# Patient Record
Sex: FEMALE | Race: WHITE
[De-identification: names, ages, dates, MRNs, and addresses within clinical notes are randomized per-mention and may not be internally consistent; named-entity substitution may affect disease eponyms.]

---

## 2017-07-14 ENCOUNTER — HOSPITAL ENCOUNTER (OUTPATIENT)
Dept: HOSPITAL 75 - RAD | Age: 47
End: 2017-07-14
Attending: FAMILY MEDICINE
Payer: COMMERCIAL

## 2017-07-14 DIAGNOSIS — Z12.31: Primary | ICD-10-CM

## 2017-07-14 PROCEDURE — 77067 SCR MAMMO BI INCL CAD: CPT

## 2017-07-14 NOTE — DIAGNOSTIC IMAGING REPORT
Bilateral screening mammogram 2D views with tomosynthesis



The current study was also evaluated with a Computer Aided

Detection (CAD) system.



Indication: Screening. No current complaints stated on the

questionnaire.



COMPARISON: 6/10/15



FINDINGS:

The breasts are composed of heterogeneously dense parenchyma

which would decrease mammographic sensitivity. There are

benign-appearing calcifications seen. Allowing for technique and

positional differences, no suspicious change is seen.



IMPRESSION: Dense breasts with no definite change. 



ACR BI-RADS Category 2: Benign findings.

Result letter will be mailed to the patient.

Note: At least 10% of breast cancer is not imaged by mammography.



 



Dictated by: 



  Dictated on workstation # [id number]

## 2018-03-28 NOTE — HISTORY AND PHYSICAL
DATE OF SERVICE:  



COLONOSCOPY HISTORY AND PHYSICAL



DATE OF ADMISSION:

2018.



HISTORY OF PRESENT ILLNESS:

The patient is a 48-year-old white female referred by Dr. Rivers for her first

screening colonoscopy.  She was seen in the office on 2018.  She is deemed

to be of average risk as she is not aware of any family history for colon cancer

or colon polyps.  She has noted about a 5-pound weight loss and is thin framed

to begin with.  Over the past several months that she has attributed to

situational anxiety.  She denies any associated fatigue, chills, fever or

malaise.  She also denies insomnia or any depressive symptoms.  She has had no

abdominal pain and has noted no blood in her stool.



MEDICATIONS ON ADMISSION:

Include Synthroid 100 mcg, but half a tablet on Saturday and  and full

tablet other days of the week, vitamin D 3000 units daily, and a multiple

vitamin with calcium daily.



PAST SURGICAL HISTORY:

She did have a tonsillectomy and adenoidectomy as a child.



PAST MEDICAL HISTORY:

Significant for Hashimoto's thyroiditis, and anxiety, reporting no other

problems.



FAMILY HISTORY:

Father  of alcoholism-related medical issues and also had prostate cancer at

the age of 53.  Mother is obese still living at the age of 72 with hypertension,

hyperlipidemia and history of hyperparathyroidism.



SOCIAL HISTORY:

She is a  and employed by [employer] with rare alcohol intake and no

past smoking history.



PHYSICAL EXAMINATION:

GENERAL:  Reveals an articulate, well-kempt white female appears to be in no

acute distress.

VITAL SIGNS:  She is roughly a 5 feet 2 inches tall and weighing 129.6 pounds,

blood pressure 120/80 with a heart rate of 72 and regular.

CHEST:  Clear to auscultation.

CARDIOVASCULAR:  Reveals a regular rate and rhythm without murmur, S3 or S4.

NECK:  Revealed no JVD, adenopathy or bruits.

ABDOMEN:  Soft, supple without mass, organomegaly or tenderness.

EXTREMITIES:  Reveal no cyanosis, clubbing or edema.



Medical record was reviewed.  She did have an echocardiogram done in  for

reported heart murmur.  Her systolic function was normal with ejection fraction

60%. Dr. Royal did report moderate tricuspid insufficiency and elevated pulmonic

pressure estimated at 50 mmHg.  She does not exercise on a regular basis, but

did not have a murmur today nor did she have any findings to suggest pulmonary

hypertension. She denies shortness of breath, chest pain or any history of

thromboembolic disease with no evidence for cyanosis, clubbing or edema on

physical examination.



ASSESSMENT:

The patient was set up for screening colonoscopy on 2017.  In her

evaluation and review of electronic medical record, 45 minutes care time spent

today.  Prep instructions with Suprep kit were given and questions were

answered.



I thank you for the referral of this pleasant lady.





Job ID: [id number]

DocumentID: [id number]

Dictated Date:  2018 11:35:26

Transcription Date: 2018 12:08:39

Dictated By: MAY FOWLER MD

MTDD

## 2018-04-09 ENCOUNTER — HOSPITAL ENCOUNTER (OUTPATIENT)
Dept: HOSPITAL 75 - PREOP | Age: 48
End: 2018-04-09
Attending: INTERNAL MEDICINE
Payer: COMMERCIAL

## 2018-04-09 VITALS — BODY MASS INDEX: 20.49 KG/M2 | WEIGHT: 120 LBS | HEIGHT: 64 IN

## 2018-04-09 DIAGNOSIS — Z12.11: ICD-10-CM

## 2018-04-09 DIAGNOSIS — Z01.818: Primary | ICD-10-CM

## 2018-04-13 ENCOUNTER — HOSPITAL ENCOUNTER (OUTPATIENT)
Dept: HOSPITAL 75 - ENDO | Age: 48
Discharge: HOME | End: 2018-04-13
Attending: INTERNAL MEDICINE
Payer: COMMERCIAL

## 2018-04-13 VITALS — DIASTOLIC BLOOD PRESSURE: 70 MMHG | SYSTOLIC BLOOD PRESSURE: 108 MMHG

## 2018-04-13 VITALS — SYSTOLIC BLOOD PRESSURE: 108 MMHG | DIASTOLIC BLOOD PRESSURE: 68 MMHG

## 2018-04-13 VITALS — DIASTOLIC BLOOD PRESSURE: 48 MMHG | SYSTOLIC BLOOD PRESSURE: 93 MMHG

## 2018-04-13 VITALS — WEIGHT: 120 LBS | BODY MASS INDEX: 20.49 KG/M2 | HEIGHT: 64 IN

## 2018-04-13 DIAGNOSIS — Z79.899: ICD-10-CM

## 2018-04-13 DIAGNOSIS — R14.0: ICD-10-CM

## 2018-04-13 DIAGNOSIS — E06.3: ICD-10-CM

## 2018-04-13 DIAGNOSIS — F41.9: ICD-10-CM

## 2018-04-13 DIAGNOSIS — R14.3: ICD-10-CM

## 2018-04-13 DIAGNOSIS — D12.0: ICD-10-CM

## 2018-04-13 DIAGNOSIS — Z12.11: Primary | ICD-10-CM

## 2018-04-13 PROCEDURE — 88305 TISSUE EXAM BY PATHOLOGIST: CPT

## 2018-04-13 RX ADMIN — FENTANYL CITRATE PRN MCG: 50 INJECTION, SOLUTION INTRAMUSCULAR; INTRAVENOUS at 08:44

## 2018-04-13 RX ADMIN — MIDAZOLAM HYDROCHLORIDE PRN MG: 1 INJECTION, SOLUTION INTRAMUSCULAR; INTRAVENOUS at 09:05

## 2018-04-13 RX ADMIN — MIDAZOLAM HYDROCHLORIDE PRN MG: 1 INJECTION, SOLUTION INTRAMUSCULAR; INTRAVENOUS at 08:45

## 2018-04-13 RX ADMIN — FENTANYL CITRATE PRN MCG: 50 INJECTION, SOLUTION INTRAMUSCULAR; INTRAVENOUS at 08:57

## 2018-04-13 RX ADMIN — MIDAZOLAM HYDROCHLORIDE PRN MG: 1 INJECTION, SOLUTION INTRAMUSCULAR; INTRAVENOUS at 08:55

## 2018-04-13 RX ADMIN — FENTANYL CITRATE PRN MCG: 50 INJECTION, SOLUTION INTRAMUSCULAR; INTRAVENOUS at 08:47

## 2018-04-13 RX ADMIN — FENTANYL CITRATE PRN MCG: 50 INJECTION, SOLUTION INTRAMUSCULAR; INTRAVENOUS at 09:02

## 2018-04-13 NOTE — PRE-OP NOTE & CONSCIOUS SEDAT
Pre-Operative Progress Note


H&P Reviewed


The H&P was reviewed, patient examined and no changes noted.


Date H&P Reviewed:  Apr 13, 2018


Time H&P Reviewed:  08:00





Conscious Sedation Pre-Proced


ASA Class:  1, 2











Airway Mallampati Classification: ([place] appropriate class) I.  II.  III,  IV


 


Lungs 


 


Heart 


 


 ASA score


 


 ASA 1: a normal healthy patient


 


 ASA 2:  a patient with a mild systemic disease (mid diabetes, controlled 

hypertension, obesity 


 


 ASA 3:  a patient with a severe systemic disease that limits activity  (angina

, COPD, prior Myocardial infarction)


 


 ASA 4:  a patient with an incapacitating disease that is a constant threat to 

life (CHF, renal failure)


 


 ASA 5:  a moribund patient not expected to survive 24 hrs.  (ruptured aneurysm)


 


 ASA 6:  a declared brain dead patient whose organs are being harvested.


 


 For emergent operations, add the letter E after the classification








Grade 1


Sedation Plan:  Analgesia, Amnesia, Plan communicated to team members, 

Discussed options with patient/fam, Discussed risks with patient/fam


Note


The patient is an appropriate candidate to undergo the planned procedure, 

sedation, and anesthesia.





The patient immediately re-assessed prior to indication.











MAY FOWLER MD Apr 13, 2018 08:01

## 2018-04-14 NOTE — OPERATIVE REPORT
DATE OF SERVICE:  04/13/2018



COLONOSCOPY



INDICATION FOR PROCEDURE:

Screening colonoscopy.



DESCRIPTION OF PROCEDURE:

The patient is placed in the left lateral decubitus position.  Prior to

undergoing colonoscopy, digital rectal evaluation was performed.  Anal sphincter

tone was normal and the perianal reflexes intact.  No abnormalities were noted

on digital inspection of the anal canal or distal rectal vault.  The colonoscope

was then inserted into the rectum and under direct visualization advanced in to

the cecum.  The cecum was identified by identification of the ileocecal valve

and cecal strap.  Photographic documentation was obtained.  Careful inspection

was made as the colonoscope was withdrawn.



FINDINGS:

There was no evidence for internal or external hemorrhoids and the rectum,

sigmoid colon, descending colon, splenic flexure, transverse colon, hepatic

flexure and ascending colon were unremarkable.  One diminutive 2 mm sessile

cecal polyp was noted.  It was then biopsied, ablated and submitted for

histopathology with no subsequent blood loss.



ASSESSMENT AND PLAN.

One diminutive cecal polyp was removed via hot forceps today as noted above.  As

long as there is no surprise on histopathology, would just advocate

consideration for repeat screening colonoscopy in 10 years as the patient is not

aware of any family history for colon cancer.  No other abnormalities noted on

today's procedure.  The patient has been troubled by increased flatulence with

some intermittent abdominal bloating.  Did discuss lactose intolerance issues as

her history suggests this possibility.  I advised that she can try initiating

probiotic therapy as well.  Culturelle 1 to 2 capsules daily and give it a month

to see if this helps if lactose avoidance is not helpful.



I thank you for the referral of this pleasant lady.



Sincerely,





Job ID: [id number]

DocumentID: [id number]

Dictated Date:  04/13/2018 12:39:10

Transcription Date: 04/13/2018 16:26:22

Dictated By: MAY FOWLER MD

[facility]

## 2018-04-15 NOTE — XMS REPORT
Continuity of Care Document

 Created on: 04/15/2018



DEENA COE

External Reference #: [id number]

: 1970

Sex: Female



Demographics







 Address  [address] [place]  [address]

 

 Home Phone  [phone] x

 

 Preferred Language  Unknown

 

 Marital Status  Unknown

 

 Denominational Affiliation  Unknown

 

 Race  Unknown

 

 Ethnic Group  Unknown





Author







 Author  [facility]

 

 Organization  [facility]

 

 Address  Unknown

 

 Phone  Unavailable



              



Allergies

      





 Active            Description            Code            Type            
Severity            Reaction            Onset            Reported/Identified   
         Relationship to Patient            Clinical Status        

 

 Yes            Sulfa (Sulfonamide Antibiotics)            [id number]          
  Drug Allergy            Unknown            RASH                         2018                                  



                  



Medications

      



There is no data.                  



Problems

      





 Date Dx Coded            Attending            Type            Code            
Diagnosis            Diagnosed By        

 

 2017            CRISTIANO ENRIQUEZ MD            Ot            V76.12 
           OTH SCREEN MAMMO-MALIGN NEOPLASM OF EMMANUEL                     

 

 2017            XAVIER AGUIRRE MD            Ot            Z12.31    
        ENCNTR SCREEN MAMMOGRAM FOR MALIGNANT NE                     

 

 2017            XAVIER AGUIRRE MD            Ot            Z12.31    
        ENCNTR SCREEN MAMMOGRAM FOR MALIGNANT NE                     

 

 2017            XAVIER AGUIRRE MD            Ot            Z12.31    
        ENCNTR SCREEN MAMMOGRAM FOR MALIGNANT NE                     

 

 2017            XAVIER AGUIRRE MD            Ot            Z12.31    
        ENCNTR SCREEN MAMMOGRAM FOR MALIGNANT NE                     

 

 2018            MAY FOWLER MD            Ot            Z01.818     
       ENCOUNTER FOR OTHER PREPROCEDURAL EXAMIN                     

 

 2018            MAY FOWLER MD            Ot            Z12.11      
      ENCOUNTER FOR SCREENING FOR MALIGNANT NE                     

 

 2018            CRISTIANO ENRIQUEZ MD            Ot            V76.12 
           OTH SCREEN MAMMO-MALIGN NEOPLASM OF EMMANUEL                     

 

 2018            XAVIER AGUIRRE MD            Ot            Z12.31    
        ENCNTR SCREEN MAMMOGRAM FOR MALIGNANT NE                     

 

 2018            CRISTIANO ENRIQUEZ MD            Ot            V76.12 
           OTH SCREEN MAMMO-MALIGN NEOPLASM OF EMMANUEL                     

 

 2018            XAVIER AGUIRRE MD            Ot            Z12.31    
        ENCNTR SCREEN MAMMOGRAM FOR MALIGNANT NE                     



                                      



Procedures

      



There is no data.                  



Results

      



There is no data.              



Encounters

      





 ACCT No.            Visit Date/Time            Discharge            Status    
        Pt. Type            Provider            Facility            Loc./Unit  
          Complaint        

 

 [id number]            2018 07:10:00            2018 11:20:00    
        DIS            Outpatient            MAY FOWLER MD            [facility]            ENDO            SCREENING        

 

 [id number]            2018 11:00:00            2018 14:16:00    
        DIS            Outpatient            MAY FOWLER MD            [facility]            PREOP            COLONOSCOPY        

 

 [id number]            2017 07:32:00            2017 23:59:59    
        CLS            Outpatient            XAVIER AGUIRRE MD            
[facility]            RAD            SCREENING Z12.31    
    

 

 [id number]            06/10/2015 10:01:00            06/10/2015 23:59:59    
        CLS            Outpatient            CRISTIANO ENRIQUEZ MD            
[facility]            RAD            SCREENING        

 

 [id number]            2018 14:28:00                                   
   Document Registration

## 2018-04-15 NOTE — XMS REPORT
Clinical Summary

 Created on: 04/15/2018



Mirta Ray

External Reference #: [id number]

: 1970

Sex: Female



Demographics







 Address  [address] [place]  [address]

 

 Home Phone  [phone]

 

 Preferred Language  English

 

 Marital Status  Unknown

 

 [employer] Affiliation  UNK

 

 Race  White

 

 Ethnic Group  Not  or 





Author







 Author  [facility]

 

 Organization  [facility]

 

 Address  Unknown

 

 Phone  Unavailable







Support







 Name  Relationship  Address  Phone

 

 NONE,GIVEN  ECON  [address] [place]  [address]  [phone]







Care Team Providers







 Care Team Member Name  Role  Phone

 

  PCP  Unavailable







Source Comments

Some departments are not documenting in the electronic medical record.  If you 
do not see the information that you expected, contact Release of Information in 
the Health Information Management department at [phone] for further 
assistance in locating additional records.[facility]



Allergies

Not on File



Current Medications

Not on file



Active Problems





Not on file



Social History







    



  Tobacco Use   Types   Packs/Day   Years Used   Date

 

    



  Never Assessed    









 



  Sex Assigned at Birth   Date Recorded

 

 



  Not on file 







Last Filed Vital Signs

Not on file



Plan of Treatment







   



  Health Maintenance   Due Date   Last Done   Comments

 

   



  PHYSICAL (COMPREHENSIVE)   1975  



  EXAM   

 

   



  PERTUSSIS VACCINE   1979  

 

   



  HIV SCREENING   1983  

 

   



  TETANUS VACCINE   1985  

 

   



  CERVICAL CANCER SCREENING   1998  

 

   



  BREAST CANCER SCREENING   2008  

 

   



  COLORECTAL CANCER   2018  



  SCREENING   

 

   



  INFLUENZA VACCINE   10/01/2018  







Results

Not on filefrom Last 3 Months

## 2019-04-15 ENCOUNTER — HOSPITAL ENCOUNTER (OUTPATIENT)
Dept: HOSPITAL 75 - RAD | Age: 49
End: 2019-04-15
Attending: FAMILY MEDICINE
Payer: COMMERCIAL

## 2019-04-15 DIAGNOSIS — Z12.31: Primary | ICD-10-CM

## 2019-04-15 PROCEDURE — 77067 SCR MAMMO BI INCL CAD: CPT

## 2021-06-15 ENCOUNTER — HOSPITAL ENCOUNTER (OUTPATIENT)
Dept: HOSPITAL 75 - RAD | Age: 51
End: 2021-06-15
Attending: FAMILY MEDICINE
Payer: COMMERCIAL

## 2021-06-15 DIAGNOSIS — Z12.31: Primary | ICD-10-CM

## 2021-06-15 PROCEDURE — 77063 BREAST TOMOSYNTHESIS BI: CPT

## 2021-06-15 PROCEDURE — 77067 SCR MAMMO BI INCL CAD: CPT

## 2021-06-15 NOTE — DIAGNOSTIC IMAGING REPORT
INDICATION: Routine screening.



COMPARISON is made with prior mammograms from 4/15/2019 and

7/14/2017.



2-D and 3-D bilateral screening mammography was performed with

CAD.



Both breasts remain heterogeneously dense, limiting the

sensitivity of mammography. The parenchymal pattern is stable. No

mass or malignant appearing microcalcifications are seen. There

are benign calcifications in the left breast. Axillae are

unremarkable.



IMPRESSION: BI-RADS Category 2



No mammographic features suspicious for malignancy are

identified.



ACR BI-RADS Category 2: Benign findings.

Result letter will be mailed to the patient.

Note: At least 10% of breast cancer is not imaged by mammography.



Dictated by: 



  Dictated on workstation # [id number]

## 2022-06-16 ENCOUNTER — HOSPITAL ENCOUNTER (OUTPATIENT)
Dept: HOSPITAL 75 - RAD | Age: 52
End: 2022-06-16
Attending: FAMILY MEDICINE
Payer: COMMERCIAL

## 2022-06-16 DIAGNOSIS — Z12.31: Primary | ICD-10-CM

## 2022-06-16 PROCEDURE — 77063 BREAST TOMOSYNTHESIS BI: CPT

## 2022-06-16 PROCEDURE — 77067 SCR MAMMO BI INCL CAD: CPT

## 2022-06-16 NOTE — DIAGNOSTIC IMAGING REPORT
Indication: Routine screening.



Comparison is made with prior mammograms from 6/15/2021 and

4/15/2019.



2-D and 3-D bilateral screening mammography was performed with

CAD.



Both breasts are heterogeneously dense, limiting the sensitivity

of mammography. No mass or malignant-appearing

microcalcifications are seen. Benign calcification on  the left

is noted. Axillae are unremarkable.



IMPRESSION: BI-RADS Category 2



No mammographic features suspicious for malignancy are

identified.



ACR BI-RADS Category 2: Benign findings.

Result letter will be mailed to the patient.

Note: At least 10% of breast cancer is not imaged by mammography.



Dictated by: 



  Dictated on workstation # [id number]

## 2023-06-05 ENCOUNTER — HOSPITAL ENCOUNTER (OUTPATIENT)
Dept: HOSPITAL 75 - RAD | Age: 53
End: 2023-06-05
Attending: FAMILY MEDICINE
Payer: COMMERCIAL

## 2023-06-05 DIAGNOSIS — Z12.31: Primary | ICD-10-CM

## 2023-06-05 PROCEDURE — 77063 BREAST TOMOSYNTHESIS BI: CPT

## 2023-06-05 PROCEDURE — 77067 SCR MAMMO BI INCL CAD: CPT

## 2023-06-05 NOTE — DIAGNOSTIC IMAGING REPORT
INDICATION: 

Routine screening.



COMPARISON:

06/16/2022 and 06/15/2021.



TECHNIQUE: 

2D and 3D bilateral screening mammography was performed with CAD.



FINDINGS:

Both breasts are heterogeneously dense, limiting the sensitivity

of mammography. No mass or malignant-appearing

microcalcifications are seen. There is benign calcification on

the left. The axillae are unremarkable.



IMPRESSION: 

No mammographic features suspicious for malignancy are

identified.



ACR BI-RADS Category 2: Benign findings.

Result letter will be mailed to the patient.

Note: At least 10% of breast cancer is not imaged by mammography.



Dictated by: 



  Dictated on workstation # [id number]

## 2023-06-23 NOTE — DIAGNOSTIC IMAGING REPORT
Confirmed refills on file with pharmacy. They will have them readied today.   INDICATION: Routine screening.



Comparison is made with prior mammogram from 07/14/2017 and

06/10/2015.



2-D and 3-D bilateral screening mammography was performed CAD.



Current study was also evaluated with a Computer Aided Detection

(CAD) system.



Both breasts remain heterogeneously dense, limiting the

sensitivity of mammography. Benign calcification left breast is

again noted. No mass or malignant appearing microcalcifications

are seen. The axillae are unremarkable.



IMPRESSION: No mammographic features suspicious for malignancy

are identified.



ACR BI-RADS Category 2: Benign findings.

Result letter will be mailed to the patient.

Note: At least 10% of breast cancer is not imaged by mammography.



Dictated by: 



  Dictated on workstation # [id number]